# Patient Record
Sex: MALE
[De-identification: names, ages, dates, MRNs, and addresses within clinical notes are randomized per-mention and may not be internally consistent; named-entity substitution may affect disease eponyms.]

---

## 2024-08-26 ENCOUNTER — APPOINTMENT (OUTPATIENT)
Dept: HEART AND VASCULAR | Facility: CLINIC | Age: 39
End: 2024-08-26
Payer: SELF-PAY

## 2024-08-26 DIAGNOSIS — Q27.30 ARTERIOVENOUS MALFORMATION, SITE UNSPECIFIED: ICD-10-CM

## 2024-08-26 DIAGNOSIS — Z87.891 PERSONAL HISTORY OF NICOTINE DEPENDENCE: ICD-10-CM

## 2024-08-26 PROCEDURE — 99205 OFFICE O/P NEW HI 60 MIN: CPT

## 2024-08-26 PROCEDURE — G2211 COMPLEX E/M VISIT ADD ON: CPT

## 2024-08-27 NOTE — END OF VISIT
[Time Spent: ___ minutes] : I have spent [unfilled] minutes of time on the encounter which excludes teaching and separately reported services. [FreeTextEntry3] : I, Dr. Martinez, personally performed the evaluation and management (E/M) services for this new patient who presents today with (a) new problem(s)/exacerbation of (an) existing condition(s). That E/M includes conducting the examination, assessing all new/exacerbated conditions, and establishing a new plan of care. Today, my ACP, Lakesha SPICER, was here to observe my evaluation and management services for this new problem/exacerbated condition to be followed going forward.

## 2024-08-27 NOTE — ASSESSMENT
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 He had some "lumps" removed from his foot in early childhood. He's seen many physicians over the years and about seven years ago had angiography and embolization performed in [place]. He had MRI studies performed at that time which he brought with him today. These studies demonstrate a very diffuse intramuscular venous malformation involving the entire Soleus muscle with other components which are more superficial in the subcutaneous tissues. Functionally he actually does quite well with full activity including athletics. He uses a compression garment on the calf constantly. On physical exam he has asymmetry of the legs with enlargement of the left calf with no appreciable skin changes. On palpation the calf is non pulsatile and nontender. The MRA portion of the scan shows some small branches of the tibial vessels which supply the malformation although there is no significant shunting. When he had the treatment in [place], absolute alcohol was used both by direct injection and into arterial feeders. He had fairly severe hematuria after the procedure with renal pain.  He did not really appreciate much improvement in the leg from the embolization procedures and also noted that he was less able to spread his toes compared to the other side after the procedure. He came here today specifically from [facility] to discuss the nature of his problem and how it should be handled. I spent a long time explaining the nature of the condition and that in fact, he most likely would continue to do well even without any further treatment just using compression. He was concerned about the effect on his heart, which I assured him was not an issue with low flow lesions. We talked about the different embolic agents and the risks and benefits of each, as well as the possibility of surgery, which is not applicable for his problem.  We also discussed the underlying biology of the problem and the possibility of drug treatment which I don't think it's indicated in his case. We also discussed genetic aspects as he has a new child on the way who I told him would be unlikely to be affected. He was appreciative of the information and will contact me if there is any change in his clinical status.

## 2024-08-27 NOTE — HISTORY OF PRESENT ILLNESS
[FreeTextEntry1] : Sherie Maria is a 39-year-old man with no pmhx who flew in from [place] for an initial consultation of his venous malformation. At age 7, he had what he described as an excision of 2 round mass on the dorsum of his left foot. During his teenage years, his saw numerous doctors for his left calf which was much bigger than his right. The calf at times felt heavy and tight but no intervention was done. In his 20's he moved to [place] for school, and he saw an interventional radiologist who told him he had a venous malformation. He proceeded to have 3 embolization with alcohol in 2017. Post each procedure he had b/l kidney pain and reports hematuria. He also reports not being able to fan his toes on his left foot since the embolizations. Since he had no improvement from the size of his calf and worried about his kidney function, he refused further treatments.  He is here today for a consultation. Denies any pain, knee issues and able to walk long distances. He uses compression stockings daily which helps with symptoms.

## 2024-08-27 NOTE — ASSESSMENT
[Venous malformation] : venous malformation [Other: _____] : [unfilled] [FreeTextEntry1] : ===================================== Sherie Maria is a 39-year-old Kurdish male (lives in [facility] region of [facility] and works with [employer]) who since early childhood has had a vascular malformation involving his left leg primarily from the knee to the foot. He had some "lumps" removed from his foot in early childhood. He's seen many physicians over the years and about seven years ago had angiography and embolization performed in [place]. He had MRI studies performed at that time which he brought with him today. These studies demonstrate a very diffuse intramuscular venous malformation involving the entire Soleus muscle with other components which are more superficial in the subcutaneous tissues. Functionally he actually does quite well with full activity including athletics. He uses a compression garment on the calf constantly. On physical exam he has asymmetry of the legs with enlargement of the left calf with no appreciable skin changes. On palpation the calf is non pulsatile and nontender. The MRA portion of the scan shows some small branches of the tibial vessels which supply the malformation although there is no significant shunting. When he had the treatment in [place], absolute alcohol was used both by direct injection and into arterial feeders. He had fairly severe hematuria after the procedure with renal pain.  He did not really appreciate much improvement in the leg from the embolization procedures and also noted that he was less able to spread his toes compared to the other side after the procedure. He came here today specifically from [facility] to discuss the nature of his problem and how it should be handled. I spent a long time explaining the nature of the condition and that in fact, he most likely would continue to do well even without any further treatment just using compression. He was concerned about the effect on his heart, which I assured him was not an issue with low flow lesions. We talked about the different embolic agents and the risks and benefits of each, as well as the possibility of surgery, which is not applicable for his problem.  We also discussed the underlying biology of the problem and the possibility of drug treatment which I don't think it's indicated in his case. We also discussed genetic aspects as he has a new child on the way who I told him would be unlikely to be affected. He was appreciative of the information and will contact me if there is any change in his clinical status.

## 2024-08-27 NOTE — PHYSICAL EXAM
[Alert] : alert [No Acute Distress] : no acute distress [Well Nourished] : well nourished [Well Developed] : well developed [Normal Sclera/Conjunctiva] : normal sclera/conjunctiva [EOMI] : extra occular movement intact [No Proptosis] : no proptosis [Normal Oropharynx] : the oropharynx was normal [Thyroid Not Enlarged] : the thyroid was not enlarged [No Thyroid Nodules] : there were no palpable thyroid nodules [No Respiratory Distress] : no respiratory distress [No Accessory Muscle Use] : no accessory muscle use [Clear to Auscultation] : lungs were clear to auscultation bilaterally [Normal Rate] : heart rate was normal  [Normal S1, S2] : normal S1 and S2 [Regular Rhythm] : with a regular rhythm [Pedal Pulses Normal] : the pedal pulses are present [No Edema] : there was no peripheral edema [Normal Bowel Sounds] : normal bowel sounds [Not Tender] : non-tender [Soft] : abdomen soft [Not Distended] : not distended [Normal Post Cervical Nodes] : posterior cervical nodes [Normal Anterior Cervical Nodes] : anterior cervical nodes [Normal Axillary Nodes] : axillary nodes [No Spinal Tenderness] : no spinal tenderness [Spine Straight] : spine straight [No Stigmata of Cushings Syndrome] : no stigmata of cushings syndrome [Normal Gait] : normal gait [No Rash] : no rash [Normal Reflexes] : deep tendon reflexes were 2+ and symmetric [No Tremors] : no tremors [Oriented x3] : oriented to person, place, and time [Acanthosis Nigricans___] : no acanthosis nigricans [Normal Hearing] : hearing was normal [No Neck Mass] : no neck mass was observed [Supple] : the neck was supple [No Joint Swelling] : no joint swelling seen [Normal Strength/Tone] : muscle strength and tone were normal [No Skin Lesions] : no skin lesions [Normal Insight/Judgement] : insight and judgment were intact [Normal Affect] : the affect was normal [Normal Mood] : the mood was normal [Fully active, able to carry on all pre-disease performance without restriction] : Fully active, able to carry on all pre-disease performance without restriction [No Motor Deficits] : the motor exam was normal [de-identified] : Left calf>right calf, unable to fan toes on his left foot

## 2024-08-27 NOTE — HISTORY OF PRESENT ILLNESS
[FreeTextEntry1] : Sherie Maria is a 39-year-old man with no pmhx who flew in from [place] for an initial consultation of his venous malformation. At age 7, he had what he described as an excision of 2 round mass on the dorsum of his left foot. During his teenage years, his saw numerous doctors for his left calf which was much bigger than his right. The calf at times felt heavy and tight but no intervention was done. In his 20's he moved to [place] for school, and he saw an interventional radiologist who told him he had a venous malformation. He proceeded to have 3 embolization with alcohol in 2017. Post each procedure he had b/l kidney pain and reports hematuria. He also reports not being able to fan his toes on his left foot since the embolizations. Since he had no improvement from the size of his calf and worried about his kidney function, he refused further treatments.  He is here today for a consultation. Denies any pain, knee issues and able to walk long distances. He uses compression stockings daily which helps with symptoms.

## 2024-08-27 NOTE — PHYSICAL EXAM
[Alert] : alert [No Acute Distress] : no acute distress [Well Nourished] : well nourished [Well Developed] : well developed [Normal Sclera/Conjunctiva] : normal sclera/conjunctiva [EOMI] : extra occular movement intact [No Proptosis] : no proptosis [Normal Oropharynx] : the oropharynx was normal [Thyroid Not Enlarged] : the thyroid was not enlarged [No Thyroid Nodules] : there were no palpable thyroid nodules [No Respiratory Distress] : no respiratory distress [No Accessory Muscle Use] : no accessory muscle use [Clear to Auscultation] : lungs were clear to auscultation bilaterally [Normal Rate] : heart rate was normal  [Normal S1, S2] : normal S1 and S2 [Regular Rhythm] : with a regular rhythm [Pedal Pulses Normal] : the pedal pulses are present [No Edema] : there was no peripheral edema [Normal Bowel Sounds] : normal bowel sounds [Not Tender] : non-tender [Soft] : abdomen soft [Not Distended] : not distended [Normal Post Cervical Nodes] : posterior cervical nodes [Normal Anterior Cervical Nodes] : anterior cervical nodes [Normal Axillary Nodes] : axillary nodes [No Spinal Tenderness] : no spinal tenderness [Spine Straight] : spine straight [No Stigmata of Cushings Syndrome] : no stigmata of cushings syndrome [Normal Gait] : normal gait [No Rash] : no rash [Normal Reflexes] : deep tendon reflexes were 2+ and symmetric [No Tremors] : no tremors [Oriented x3] : oriented to person, place, and time [Acanthosis Nigricans___] : no acanthosis nigricans [Normal Hearing] : hearing was normal [No Neck Mass] : no neck mass was observed [Supple] : the neck was supple [No Joint Swelling] : no joint swelling seen [Normal Strength/Tone] : muscle strength and tone were normal [No Skin Lesions] : no skin lesions [Normal Insight/Judgement] : insight and judgment were intact [Normal Affect] : the affect was normal [Normal Mood] : the mood was normal [Fully active, able to carry on all pre-disease performance without restriction] : Fully active, able to carry on all pre-disease performance without restriction [No Motor Deficits] : the motor exam was normal [de-identified] : Left calf>right calf, unable to fan toes on his left foot

## 2024-08-27 NOTE — ASSESSMENT
[Venous malformation] : venous malformation [Other: _____] : [unfilled] [FreeTextEntry1] : ===================================== Sherie Maria is a 39-year-old Kurdish male (lives in [facility] region of [facility] and works with [employer]) who since early childhood has had a vascular malformation involving his left leg primarily from the knee to the foot. He had some "lumps" removed from his foot in early childhood. He's seen many physicians over the years and about seven years ago had angiography and embolization performed in [place]. He had MRI studies performed at that time which he brought with him today. These studies demonstrate a very diffuse intramuscular venous malformation involving the entire Soleus muscle with other components which are more superficial in the subcutaneous tissues. Functionally he actually does quite well with full activity including athletics. He uses a compression garment on the calf constantly. On physical exam he has asymmetry of the legs with enlargement of the left calf with no appreciable skin changes. On palpation the calf is non pulsatile and nontender. The MRA portion of the scan shows some small branches of the tibial vessels which supply the malformation although there is no significant shunting. When he had the treatment in [place], absolute alcohol was used both by direct injection and into arterial feeders. He had fairly severe hematuria after the procedure with renal pain.  He did not really appreciate much improvement in the leg from the embolization procedures and also noted that he was less able to spread his toes compared to the other side after the procedure. He came here today specifically from [facility] to discuss the nature of his problem and how it should be handled. I spent a long time explaining the nature of the condition and that in fact, he most likely would continue to do well even without any further treatment just using compression. He was concerned about the effect on his heart, which I assured him was not an issue with low flow lesions. We talked about the different embolic agents and the risks and benefits of each, as well as the possibility of surgery, which is not applicable for his problem.  We also discussed the underlying biology of the problem and the possibility of drug treatment which I don't think it's indicated in his case. We also discussed genetic aspects as he has a new child on the way who I told him would be unlikely to be affected. He was appreciative of the information and will contact me if there is any change in his clinical status.

## 2024-08-27 NOTE — HISTORY OF PRESENT ILLNESS
[FreeTextEntry1] : Sherie Maria is a 39-year-old man with no pmhx who flew in from [place] for an initial consultation of his venous malformation. At age 7, he had what he described as an excision of 2 round mass on the dorsum of his left foot. During his teenage years, his saw numerous doctors for his left calf which was much bigger than his right. The calf at times felt heavy and tight but no intervention was done. In his 20's he moved to [place] for school, and he saw an interventional radiologist who told him he had a venous malformation. He proceeded to have 3 embolization with alcohol in 2017. Post each procedure he had b/l kidney pain and reports hematuria. He also reports not being able to fan his toes on his left foot since the embolizations. Since he had no improvement from the size of his calf and worried about his kidney function, he refused further treatments.  He is here today for a consultation. Denies any pain, knee issues and able to walk long distances. He uses compression stockings daily which helps with symptoms.